# Patient Record
Sex: MALE | Race: WHITE | Employment: UNEMPLOYED | URBAN - METROPOLITAN AREA
[De-identification: names, ages, dates, MRNs, and addresses within clinical notes are randomized per-mention and may not be internally consistent; named-entity substitution may affect disease eponyms.]

---

## 2019-05-14 ENCOUNTER — HOSPITAL ENCOUNTER (EMERGENCY)
Age: 34
Discharge: HOME | End: 2019-05-14
Attending: EMERGENCY MEDICINE
Payer: MEDICARE

## 2019-05-14 VITALS
OXYGEN SATURATION: 100 % | HEIGHT: 70 IN | WEIGHT: 188.49 LBS | HEART RATE: 104 BPM | DIASTOLIC BLOOD PRESSURE: 83 MMHG | TEMPERATURE: 97.7 F | SYSTOLIC BLOOD PRESSURE: 121 MMHG | BODY MASS INDEX: 26.99 KG/M2 | RESPIRATION RATE: 16 BRPM

## 2019-05-14 DIAGNOSIS — T82.838A BLEEDING FROM DIALYSIS SHUNT, INITIAL ENCOUNTER (HCC): Primary | ICD-10-CM

## 2019-05-14 PROCEDURE — 99282 EMERGENCY DEPT VISIT SF MDM: CPT

## 2019-05-15 NOTE — ED PROVIDER NOTES
EMERGENCY DEPARTMENT HISTORY AND PHYSICAL EXAM 
 
Date: 5/14/2019 Patient Name: [facility] History of Presenting Illness Chief Complaint Patient presents with  Vascular Access Problem History Provided By: Patient [facility] is a 29 y.o. male with PMHX of chronic kidney disease performs at home hemodialysis who presents to the emergency department C/O leading fistula left upper extremity. Patient reports doing hemodialysis tonight as scheduled it was uncomplicated nothing abnormal but at the end of his dialysis his fistula continued to bleed despite holding pressure. Patient states he called his dialysis nurse who prompted him to come to the emergency department for possible stitch. Pt denies arm pain recent illness bleeding from other areas use of blood thinners, and any other sxs or complaints. PCP: No primary care provider on file. Past History Past Medical History: 
Past Medical History:  
Diagnosis Date  Chronic kidney disease  Diabetes ([address]  Hypertension Past Surgical History: 
History reviewed. No pertinent surgical history. Family History: 
History reviewed. No pertinent family history. Social History: 
Social History Tobacco Use  Smoking status: Never Smoker  Smokeless tobacco: Never Used Substance Use Topics  Alcohol use: Not Currently  Drug use: Not Currently Allergies: 
No Known Allergies Review of Systems Review of Systems Constitutional: Negative for fever. Respiratory: Negative for cough. Musculoskeletal: Negative for arthralgias. Skin: Positive for wound. Negative for rash. Neurological: Negative for weakness. All other systems reviewed and are negative. Physical Exam  
 
Vitals:  
 05/14/19 2052 BP: 121/83 Pulse: (!) 104 Resp: 16 Temp: 97.7 °F (36.5 °C) SpO2: 100% Weight: 85.5 kg (188 lb 7.9 oz) Height: 5' 10\" (1.778 m) Physical Exam  
 Constitutional: He appears well-developed and well-nourished. No distress. HENT:  
Head: Normocephalic and atraumatic. Eyes: Pupils are equal, round, and reactive to light. Conjunctivae and EOM are normal.  
Neck: Normal range of motion. Neck supple. Musculoskeletal: Normal range of motion. Hemodialysis fistula left upper extremity dried blood over arm no active bleeding, thrill noted Neurological: He is alert. Skin: Skin is warm and dry. Psychiatric: He has a normal mood and affect. His behavior is normal.  
Nursing note and vitals reviewed. Diagnostic Study Results Labs - No results found for this or any previous visit (from the past 12 hour(s)). Radiologic Studies - No orders to display CT Results  (Last 48 hours) None CXR Results  (Last 48 hours) None Medications given in the ED- Medications - No data to display Medical Decision Making I am the first provider for this patient. I reviewed the vital signs, available nursing notes, past medical history, past surgical history, family history and social history. Vital Signs-Reviewed the patient's vital signs. Records Reviewed: Nursing Notes Procedures: Other Procedure Date/Time: 5/14/2019 9:17 PM 
Performed by: PHYLLIS Frost Authorized by: PHYLLIS Frost Consent:  
  Consent obtained:  Verbal 
  Consent given by:  Patient Risks discussed:  Bleeding Alternatives discussed:  No treatment Anesthesia (see MAR for exact dosages): Anesthesia method:  None Post-procedure details:  
  Patient tolerance of procedure: Tolerated well, no immediate complications Comments:  
   Left upper extremity was cleansed with saline and soap, dried blood cleared away dialysis fistula noted to be without bleeding areas of access noted but again no bleeding; left upper extremity was wrapped with a clean sterile dressing ED Course:  
9:06 PM  
 Initial assessment performed. The patients presenting problems have been discussed, and they are in agreement with the care plan formulated and outlined with them. I have encouraged them to ask questions as they arise throughout their visit. Discussion: 29 y.o. male patient followed by Dr. Desiree Elena for chronic kidney disease at home dialysis 5 days a week fistula left upper extremity continued to bleed after his treatment today. Patient applied a dressing and held pressure upon initial assessment emergency department bleeding has stopped. Arms was cleansed and dressed patient stable for discharge. Strict return precautions discussed. Patient has appointment with Dr. Desiree Elena nephrology tomorrow Diagnosis and Disposition DISCHARGE NOTE: 
Rocky Romano's  results have been reviewed with him. He has been counseled regarding his diagnosis, treatment, and plan. He verbally conveys understanding and agreement of the signs, symptoms, diagnosis, treatment and prognosis and additionally agrees to follow up as discussed. He also agrees with the care-plan and conveys that all of his questions have been answered. I have also provided discharge instructions for him that include: educational information regarding their diagnosis and treatment, and list of reasons why they would want to return to the ED prior to their follow-up appointment, should his condition change. He has been provided with education for proper emergency department utilization. CLINICAL IMPRESSION: 
 
1. Bleeding from dialysis shunt, initial encounter ([facility] [address] PLAN: 
1. D/C Home 2. There are no discharge medications for this patient. 3.  
Follow-up Information Follow up With Specialties Details Why Contact Info Jeffrey Cortes MD Nephrology, Internal Medicine Go to  [address] [place] [address] 
[phone]  [facility] EMERGENCY DEPT Emergency Medicine  As needed, If symptoms worsen [address] [address] 
[phone] Please note that this dictation was completed with [employer], the computer voice recognition software. Quite often unanticipated grammatical, syntax, homophones, and other interpretive errors are inadvertently transcribed by the computer software. Please disregard these errors. Please excuse any errors that have escaped final proofreading.